# Patient Record
Sex: MALE | Race: WHITE | NOT HISPANIC OR LATINO | Employment: OTHER | URBAN - METROPOLITAN AREA
[De-identification: names, ages, dates, MRNs, and addresses within clinical notes are randomized per-mention and may not be internally consistent; named-entity substitution may affect disease eponyms.]

---

## 2017-03-23 ENCOUNTER — APPOINTMENT (OUTPATIENT)
Dept: GENERAL RADIOLOGY | Facility: HOSPITAL | Age: 82
End: 2017-03-23

## 2017-03-23 ENCOUNTER — HOSPITAL ENCOUNTER (OUTPATIENT)
Facility: HOSPITAL | Age: 82
Setting detail: OBSERVATION
Discharge: HOME HEALTH | End: 2017-03-25
Attending: EMERGENCY MEDICINE | Admitting: INTERNAL MEDICINE

## 2017-03-23 ENCOUNTER — APPOINTMENT (OUTPATIENT)
Dept: CT IMAGING | Facility: HOSPITAL | Age: 82
End: 2017-03-23

## 2017-03-23 DIAGNOSIS — R11.10 VOMITING AND DIARRHEA: Primary | ICD-10-CM

## 2017-03-23 DIAGNOSIS — R19.7 VOMITING AND DIARRHEA: Primary | ICD-10-CM

## 2017-03-23 DIAGNOSIS — J96.01 ACUTE RESPIRATORY FAILURE WITH HYPOXIA (HCC): ICD-10-CM

## 2017-03-23 DIAGNOSIS — R07.89 ATYPICAL CHEST PAIN: ICD-10-CM

## 2017-03-23 PROBLEM — E86.0 DEHYDRATION: Status: ACTIVE | Noted: 2017-03-23

## 2017-03-23 PROBLEM — F41.9 ANXIETY: Status: ACTIVE | Noted: 2017-03-23

## 2017-03-23 PROBLEM — I10 HTN (HYPERTENSION): Status: ACTIVE | Noted: 2017-03-23

## 2017-03-23 LAB
ALBUMIN SERPL-MCNC: 4 G/DL (ref 3.5–5.2)
ALBUMIN/GLOB SERPL: 1.1 G/DL
ALP SERPL-CCNC: 96 U/L (ref 39–117)
ALT SERPL W P-5'-P-CCNC: 20 U/L (ref 1–41)
ANION GAP SERPL CALCULATED.3IONS-SCNC: 16.2 MMOL/L
AST SERPL-CCNC: 18 U/L (ref 1–40)
BACTERIA UR QL AUTO: ABNORMAL /HPF
BASOPHILS # BLD AUTO: 0 10*3/MM3 (ref 0–0.2)
BASOPHILS NFR BLD AUTO: 0 % (ref 0–1.5)
BILIRUB SERPL-MCNC: 0.5 MG/DL (ref 0.1–1.2)
BILIRUB UR QL STRIP: NEGATIVE
BUN BLD-MCNC: 19 MG/DL (ref 8–23)
BUN/CREAT SERPL: 30.2 (ref 7–25)
CALCIUM SPEC-SCNC: 9.1 MG/DL (ref 8.6–10.5)
CHLORIDE SERPL-SCNC: 99 MMOL/L (ref 98–107)
CLARITY UR: CLEAR
CO2 SERPL-SCNC: 25.8 MMOL/L (ref 22–29)
COLOR UR: YELLOW
CREAT BLD-MCNC: 0.63 MG/DL (ref 0.76–1.27)
DEPRECATED RDW RBC AUTO: 47.6 FL (ref 37–54)
EOSINOPHIL # BLD AUTO: 0 10*3/MM3 (ref 0–0.7)
EOSINOPHIL NFR BLD AUTO: 0 % (ref 0.3–6.2)
ERYTHROCYTE [DISTWIDTH] IN BLOOD BY AUTOMATED COUNT: 14.5 % (ref 11.5–14.5)
GFR SERPL CREATININE-BSD FRML MDRD: 121 ML/MIN/1.73
GLOBULIN UR ELPH-MCNC: 3.5 GM/DL
GLUCOSE BLD-MCNC: 121 MG/DL (ref 65–99)
GLUCOSE UR STRIP-MCNC: NEGATIVE MG/DL
HCT VFR BLD AUTO: 42.8 % (ref 40.4–52.2)
HGB BLD-MCNC: 14.6 G/DL (ref 13.7–17.6)
HGB UR QL STRIP.AUTO: ABNORMAL
HOLD SPECIMEN: NORMAL
HOLD SPECIMEN: NORMAL
HYALINE CASTS UR QL AUTO: ABNORMAL /LPF
IMM GRANULOCYTES # BLD: 0.03 10*3/MM3 (ref 0–0.03)
IMM GRANULOCYTES NFR BLD: 0.3 % (ref 0–0.5)
KETONES UR QL STRIP: NEGATIVE
LEUKOCYTE ESTERASE UR QL STRIP.AUTO: NEGATIVE
LIPASE SERPL-CCNC: 38 U/L (ref 13–60)
LYMPHOCYTES # BLD AUTO: 0.19 10*3/MM3 (ref 0.9–4.8)
LYMPHOCYTES NFR BLD AUTO: 2 % (ref 19.6–45.3)
MCH RBC QN AUTO: 30.9 PG (ref 27–32.7)
MCHC RBC AUTO-ENTMCNC: 34.1 G/DL (ref 32.6–36.4)
MCV RBC AUTO: 90.7 FL (ref 79.8–96.2)
MONOCYTES # BLD AUTO: 0.65 10*3/MM3 (ref 0.2–1.2)
MONOCYTES NFR BLD AUTO: 6.7 % (ref 5–12)
NEUTROPHILS # BLD AUTO: 8.84 10*3/MM3 (ref 1.9–8.1)
NEUTROPHILS NFR BLD AUTO: 91 % (ref 42.7–76)
NITRITE UR QL STRIP: NEGATIVE
NT-PROBNP SERPL-MCNC: 304.2 PG/ML (ref 0–1800)
PH UR STRIP.AUTO: 7.5 [PH] (ref 5–8)
PLATELET # BLD AUTO: 120 10*3/MM3 (ref 140–500)
PMV BLD AUTO: 10.6 FL (ref 6–12)
POTASSIUM BLD-SCNC: 3.7 MMOL/L (ref 3.5–5.2)
PROT SERPL-MCNC: 7.5 G/DL (ref 6–8.5)
PROT UR QL STRIP: ABNORMAL
RBC # BLD AUTO: 4.72 10*6/MM3 (ref 4.6–6)
RBC # UR: ABNORMAL /HPF
REF LAB TEST METHOD: ABNORMAL
SODIUM BLD-SCNC: 141 MMOL/L (ref 136–145)
SP GR UR STRIP: >=1.03 (ref 1–1.03)
SQUAMOUS #/AREA URNS HPF: ABNORMAL /HPF
TROPONIN T SERPL-MCNC: <0.01 NG/ML (ref 0–0.03)
UROBILINOGEN UR QL STRIP: ABNORMAL
WBC NRBC COR # BLD: 9.71 10*3/MM3 (ref 4.5–10.7)
WBC UR QL AUTO: ABNORMAL /HPF
WHOLE BLOOD HOLD SPECIMEN: NORMAL
WHOLE BLOOD HOLD SPECIMEN: NORMAL

## 2017-03-23 PROCEDURE — 80053 COMPREHEN METABOLIC PANEL: CPT | Performed by: EMERGENCY MEDICINE

## 2017-03-23 PROCEDURE — 0 IOPAMIDOL PER 1 ML: Performed by: EMERGENCY MEDICINE

## 2017-03-23 PROCEDURE — 93010 ELECTROCARDIOGRAM REPORT: CPT | Performed by: INTERNAL MEDICINE

## 2017-03-23 PROCEDURE — 85025 COMPLETE CBC W/AUTO DIFF WBC: CPT | Performed by: EMERGENCY MEDICINE

## 2017-03-23 PROCEDURE — 96372 THER/PROPH/DIAG INJ SC/IM: CPT

## 2017-03-23 PROCEDURE — G0378 HOSPITAL OBSERVATION PER HR: HCPCS

## 2017-03-23 PROCEDURE — 25010000002 ENOXAPARIN PER 10 MG: Performed by: INTERNAL MEDICINE

## 2017-03-23 PROCEDURE — 81001 URINALYSIS AUTO W/SCOPE: CPT | Performed by: EMERGENCY MEDICINE

## 2017-03-23 PROCEDURE — 93005 ELECTROCARDIOGRAM TRACING: CPT | Performed by: EMERGENCY MEDICINE

## 2017-03-23 PROCEDURE — 84484 ASSAY OF TROPONIN QUANT: CPT | Performed by: EMERGENCY MEDICINE

## 2017-03-23 PROCEDURE — 96375 TX/PRO/DX INJ NEW DRUG ADDON: CPT

## 2017-03-23 PROCEDURE — 83690 ASSAY OF LIPASE: CPT | Performed by: EMERGENCY MEDICINE

## 2017-03-23 PROCEDURE — 96374 THER/PROPH/DIAG INJ IV PUSH: CPT

## 2017-03-23 PROCEDURE — 25810000003 SODIUM CHLORIDE 0.9 % WITH KCL 20 MEQ 20-0.9 MEQ/L-% SOLUTION: Performed by: INTERNAL MEDICINE

## 2017-03-23 PROCEDURE — 83880 ASSAY OF NATRIURETIC PEPTIDE: CPT | Performed by: EMERGENCY MEDICINE

## 2017-03-23 PROCEDURE — 74177 CT ABD & PELVIS W/CONTRAST: CPT

## 2017-03-23 PROCEDURE — 25010000002 ONDANSETRON PER 1 MG: Performed by: EMERGENCY MEDICINE

## 2017-03-23 PROCEDURE — 99285 EMERGENCY DEPT VISIT HI MDM: CPT

## 2017-03-23 PROCEDURE — 71020 HC CHEST PA AND LATERAL: CPT

## 2017-03-23 PROCEDURE — 71275 CT ANGIOGRAPHY CHEST: CPT

## 2017-03-23 PROCEDURE — 96361 HYDRATE IV INFUSION ADD-ON: CPT

## 2017-03-23 PROCEDURE — 25010000002 MORPHINE PER 10 MG: Performed by: EMERGENCY MEDICINE

## 2017-03-23 RX ORDER — SERTRALINE HYDROCHLORIDE 100 MG/1
200 TABLET, FILM COATED ORAL DAILY
COMMUNITY

## 2017-03-23 RX ORDER — LISINOPRIL 5 MG/1
5 TABLET ORAL DAILY
Status: DISCONTINUED | OUTPATIENT
Start: 2017-03-23 | End: 2017-03-25 | Stop reason: HOSPADM

## 2017-03-23 RX ORDER — AMLODIPINE BESYLATE 2.5 MG/1
2.5 TABLET ORAL DAILY
Status: DISCONTINUED | OUTPATIENT
Start: 2017-03-23 | End: 2017-03-25 | Stop reason: HOSPADM

## 2017-03-23 RX ORDER — HYDROCODONE BITARTRATE AND ACETAMINOPHEN 7.5; 325 MG/1; MG/1
1 TABLET ORAL ONCE
Status: COMPLETED | OUTPATIENT
Start: 2017-03-23 | End: 2017-03-23

## 2017-03-23 RX ORDER — SERTRALINE HYDROCHLORIDE 100 MG/1
200 TABLET, FILM COATED ORAL DAILY
Status: DISCONTINUED | OUTPATIENT
Start: 2017-03-23 | End: 2017-03-25 | Stop reason: HOSPADM

## 2017-03-23 RX ORDER — ACETAMINOPHEN 325 MG/1
325 TABLET ORAL 4 TIMES DAILY
Status: DISCONTINUED | OUTPATIENT
Start: 2017-03-23 | End: 2017-03-25 | Stop reason: HOSPADM

## 2017-03-23 RX ORDER — SENNA AND DOCUSATE SODIUM 50; 8.6 MG/1; MG/1
2 TABLET, FILM COATED ORAL NIGHTLY PRN
COMMUNITY

## 2017-03-23 RX ORDER — ASPIRIN 325 MG
325 TABLET ORAL ONCE
Status: DISCONTINUED | OUTPATIENT
Start: 2017-03-23 | End: 2017-03-23

## 2017-03-23 RX ORDER — SODIUM CHLORIDE 0.9 % (FLUSH) 0.9 %
1-10 SYRINGE (ML) INJECTION AS NEEDED
Status: DISCONTINUED | OUTPATIENT
Start: 2017-03-23 | End: 2017-03-25 | Stop reason: HOSPADM

## 2017-03-23 RX ORDER — OMEGA-3S/DHA/EPA/FISH OIL/D3 300MG-1000
400 CAPSULE ORAL DAILY
Status: DISCONTINUED | OUTPATIENT
Start: 2017-03-23 | End: 2017-03-25 | Stop reason: HOSPADM

## 2017-03-23 RX ORDER — ASPIRIN 81 MG/1
81 TABLET ORAL DAILY
Status: DISCONTINUED | OUTPATIENT
Start: 2017-03-23 | End: 2017-03-25 | Stop reason: HOSPADM

## 2017-03-23 RX ORDER — PANTOPRAZOLE SODIUM 40 MG/1
40 TABLET, DELAYED RELEASE ORAL
Status: DISCONTINUED | OUTPATIENT
Start: 2017-03-24 | End: 2017-03-25 | Stop reason: HOSPADM

## 2017-03-23 RX ORDER — MORPHINE SULFATE 2 MG/ML
2 INJECTION, SOLUTION INTRAMUSCULAR; INTRAVENOUS EVERY 4 HOURS PRN
Status: DISCONTINUED | OUTPATIENT
Start: 2017-03-23 | End: 2017-03-25 | Stop reason: HOSPADM

## 2017-03-23 RX ORDER — MORPHINE SULFATE 2 MG/ML
2 INJECTION, SOLUTION INTRAMUSCULAR; INTRAVENOUS ONCE
Status: COMPLETED | OUTPATIENT
Start: 2017-03-23 | End: 2017-03-23

## 2017-03-23 RX ORDER — MORPHINE SULFATE 2 MG/ML
INJECTION, SOLUTION INTRAMUSCULAR; INTRAVENOUS
Status: DISPENSED
Start: 2017-03-23 | End: 2017-03-24

## 2017-03-23 RX ORDER — SODIUM CHLORIDE AND POTASSIUM CHLORIDE 150; 900 MG/100ML; MG/100ML
100 INJECTION, SOLUTION INTRAVENOUS CONTINUOUS
Status: DISCONTINUED | OUTPATIENT
Start: 2017-03-23 | End: 2017-03-25

## 2017-03-23 RX ORDER — HYDROCODONE BITARTRATE AND ACETAMINOPHEN 7.5; 325 MG/1; MG/1
1 TABLET ORAL 4 TIMES DAILY
Status: DISCONTINUED | OUTPATIENT
Start: 2017-03-23 | End: 2017-03-25 | Stop reason: HOSPADM

## 2017-03-23 RX ORDER — ASPIRIN 81 MG/1
81 TABLET ORAL DAILY
COMMUNITY

## 2017-03-23 RX ORDER — HYDROCODONE BITARTRATE AND ACETAMINOPHEN 7.5; 325 MG/1; MG/1
1 TABLET ORAL EVERY 6 HOURS PRN
Status: DISCONTINUED | OUTPATIENT
Start: 2017-03-23 | End: 2017-03-25 | Stop reason: HOSPADM

## 2017-03-23 RX ORDER — OMEPRAZOLE 20 MG/1
20 CAPSULE, DELAYED RELEASE ORAL EVERY MORNING
COMMUNITY

## 2017-03-23 RX ORDER — LORAZEPAM 0.5 MG/1
0.5 TABLET ORAL NIGHTLY
Status: DISCONTINUED | OUTPATIENT
Start: 2017-03-23 | End: 2017-03-25 | Stop reason: HOSPADM

## 2017-03-23 RX ORDER — AMLODIPINE BESYLATE 2.5 MG/1
2.5 TABLET ORAL DAILY
COMMUNITY

## 2017-03-23 RX ORDER — HYDROCODONE BITARTRATE AND ACETAMINOPHEN 7.5; 325 MG/1; MG/1
1 TABLET ORAL 4 TIMES DAILY
COMMUNITY

## 2017-03-23 RX ORDER — ERGOCALCIFEROL (VITAMIN D2) 10 MCG
400 TABLET ORAL DAILY
COMMUNITY

## 2017-03-23 RX ORDER — ONDANSETRON 2 MG/ML
4 INJECTION INTRAMUSCULAR; INTRAVENOUS EVERY 6 HOURS PRN
Status: DISCONTINUED | OUTPATIENT
Start: 2017-03-23 | End: 2017-03-25 | Stop reason: HOSPADM

## 2017-03-23 RX ORDER — LISINOPRIL 5 MG/1
5 TABLET ORAL DAILY
COMMUNITY

## 2017-03-23 RX ORDER — ONDANSETRON 2 MG/ML
4 INJECTION INTRAMUSCULAR; INTRAVENOUS ONCE
Status: COMPLETED | OUTPATIENT
Start: 2017-03-23 | End: 2017-03-23

## 2017-03-23 RX ORDER — SENNA AND DOCUSATE SODIUM 50; 8.6 MG/1; MG/1
2 TABLET, FILM COATED ORAL NIGHTLY PRN
Status: DISCONTINUED | OUTPATIENT
Start: 2017-03-23 | End: 2017-03-25 | Stop reason: HOSPADM

## 2017-03-23 RX ORDER — ALFUZOSIN HYDROCHLORIDE 10 MG/1
10 TABLET, EXTENDED RELEASE ORAL
COMMUNITY

## 2017-03-23 RX ORDER — SODIUM CHLORIDE 0.9 % (FLUSH) 0.9 %
10 SYRINGE (ML) INJECTION AS NEEDED
Status: DISCONTINUED | OUTPATIENT
Start: 2017-03-23 | End: 2017-03-25 | Stop reason: HOSPADM

## 2017-03-23 RX ORDER — ACETAMINOPHEN 325 MG/1
325 TABLET ORAL 4 TIMES DAILY
COMMUNITY

## 2017-03-23 RX ADMIN — ASPIRIN 81 MG: 81 TABLET ORAL at 22:51

## 2017-03-23 RX ADMIN — HYDROCODONE BITARTRATE AND ACETAMINOPHEN 1 TABLET: 7.5; 325 TABLET ORAL at 16:12

## 2017-03-23 RX ADMIN — AMLODIPINE BESYLATE 2.5 MG: 2.5 TABLET ORAL at 22:51

## 2017-03-23 RX ADMIN — IOPAMIDOL 95 ML: 755 INJECTION, SOLUTION INTRAVENOUS at 15:03

## 2017-03-23 RX ADMIN — LORAZEPAM 0.5 MG: 0.5 TABLET ORAL at 22:52

## 2017-03-23 RX ADMIN — HYDROCODONE BITARTRATE AND ACETAMINOPHEN 1 TABLET: 7.5; 325 TABLET ORAL at 22:50

## 2017-03-23 RX ADMIN — LISINOPRIL 5 MG: 5 TABLET ORAL at 22:51

## 2017-03-23 RX ADMIN — SODIUM CHLORIDE 500 ML: 9 INJECTION, SOLUTION INTRAVENOUS at 13:15

## 2017-03-23 RX ADMIN — ENOXAPARIN SODIUM 30 MG: 30 INJECTION SUBCUTANEOUS at 22:51

## 2017-03-23 RX ADMIN — POTASSIUM CHLORIDE AND SODIUM CHLORIDE 100 ML/HR: 900; 150 INJECTION, SOLUTION INTRAVENOUS at 22:52

## 2017-03-23 RX ADMIN — SERTRALINE 200 MG: 100 TABLET, FILM COATED ORAL at 22:50

## 2017-03-23 RX ADMIN — ONDANSETRON 4 MG: 2 INJECTION INTRAMUSCULAR; INTRAVENOUS at 16:12

## 2017-03-23 RX ADMIN — MORPHINE SULFATE 2 MG: 2 INJECTION, SOLUTION INTRAMUSCULAR; INTRAVENOUS at 13:15

## 2017-03-23 NOTE — H&P
Internal medicine history and physical   INTERNAL MEDICINE   [facility]       Patient Identification:  Name: Martin Krueger  Age: 85 y.o.  Sex: male  :  10/27/1931  MRN: [id number]                   Primary Care Physician: No Known Provider                                   Chief Complaint:  Feeling sick with chest pain and nausea and vomiting going on for a few days at least 3 days after he ate chicken.    History of Present Illness:   Patient is very vague in his description of symptoms and it was an effort to get proper information from him.  I'm still not sure whether I was able to get reliable information from him.  Patient is a 85-year-old male with conjugated past medical history has been complaining of nausea and stomach and back pain since he ate some chicken 3-4 days ago.  Patient is not sure about exact timing of his onset of symptoms.  He's been struggling with these symptoms taking hydrocodone pills without any improvement.  In the emergency room and has been having multiple episodes of diarrhea after episode of vomiting.  Patient has been having chest discomfort mainly pressure-like and has associated shortness of breath  Patient usually gets his care at the [facility].  He is pleasantly confused.      Past Medical History:  Past Medical History:   Diagnosis Date   • Arthritis    • Brain injury    • Cancer    • Hypertension    • Injury of back      Past Surgical History:  Past Surgical History:   Procedure Laterality Date   • BRAIN SURGERY     • SKIN BIOPSY        Home Meds:    (Not in a hospital admission)  Current Meds:     Current Facility-Administered Medications:   •  sodium chloride 0.9 % flush 10 mL, 10 mL, Intravenous, PRN, Prabhjot Carvalho MD    Current Outpatient Prescriptions:   •  acetaminophen (TYLENOL) 325 MG tablet, Take 325 mg by mouth 4 (Four) Times a Day. Take with hydrocodone for pain , Disp: , Rfl:   •  alfuzosin (UROXATRAL) 10 MG 24 hr tablet, Take 10 mg by  "mouth every night at bedtime., Disp: , Rfl:   •  amLODIPine (NORVASC) 2.5 MG tablet, Take 2.5 mg by mouth Daily., Disp: , Rfl:   •  aspirin 81 MG EC tablet, Take 81 mg by mouth Daily., Disp: , Rfl:   •  HYDROcodone-acetaminophen (NORCO) 7.5-325 MG per tablet, Take 1 tablet by mouth 4 (Four) Times a Day., Disp: , Rfl:   •  lisinopril (PRINIVIL,ZESTRIL) 5 MG tablet, Take 5 mg by mouth Daily., Disp: , Rfl:   •  MENTHOL-METHYL SALICYLATE EX, Apply 1 application topically 3 (Three) Times a Day., Disp: , Rfl:   •  miconazole (MICOTIN) 2 % powder, Apply 1 application topically 2 (Two) Times a Day As Needed for Itching (for feet and groin area)., Disp: , Rfl:   •  omeprazole (priLOSEC) 20 MG capsule, Take 20 mg by mouth Every Morning., Disp: , Rfl:   •  sennosides-docusate sodium (SENOKOT-S) 8.6-50 MG tablet, Take 2 tablets by mouth At Night As Needed for Constipation., Disp: , Rfl:   •  sertraline (ZOLOFT) 100 MG tablet, Take 200 mg by mouth Daily., Disp: , Rfl:   •  Tiotropium Bromide Monohydrate 2.5 MCG/ACT aerosol solution, Inhale 2 puffs Daily., Disp: , Rfl:   •  Vitamin D, Cholecalciferol, (CHOLECALCIFEROL) 400 UNITS tablet, Take 400 Units by mouth Daily., Disp: , Rfl:   Allergies:  No Known Allergies  Social History:   Social History   Substance Use Topics   • Smoking status: Unknown If Ever Smoked   • Smokeless tobacco: Not on file   • Alcohol use Defer      Family History:  History reviewed. No pertinent family history.       Review of Systems  See history of present illness and past medical history.  Constitutional remarkable for no fever or chills  Cardiovascular remarkable for chest discomfort as described  GI review systems remarkable for nausea vomiting diarrhea and abdominal pain  Vitals:   /69  Pulse 95  Temp 98 °F (36.7 °C)  Resp 16  Ht 67\" (170.2 cm)  Wt 155 lb (70.3 kg)  SpO2 93%  BMI 24.28 kg/m2  I/O: No intake or output data in the 24 hours ending 03/23/17 1276  Exam:  General Appearance:   "  Alert, resistant to examination, no distress, appears stated age   Head:    Normocephalic, without obvious abnormality, atraumatic   Eyes:    PERRL, conjunctiva/corneas clear, EOM's intact, both eyes   Ears:    Normal external ear canals, both ears   Nose:   Nares normal, septum midline, mucosa normal, no drainage    or sinus tenderness   Throat:   Lips, tongue, gums normal; oral mucosa pink and moist   Neck:   Supple, symmetrical, trachea midline, no adenopathy;     thyroid:  no enlargement/tenderness/nodules; no carotid    bruit or JVD   Back:     Symmetric, no curvature, ROM normal, no CVA tenderness   Lungs:     Clear to auscultation bilaterally, respirations unlabored   Chest Wall:    No tenderness or deformity    Heart:    Regular rate and rhythm, S1 and S2 normal, no murmur, rub   or gallop   Abdomen:     Soft, slightly distended and bloated with generalized tenderness , bowel sounds active all four quadrants,     no masses, no hepatomegaly, no splenomegaly   Extremities:   Extremities normal, atraumatic, no cyanosis or edema   Pulses:   Pulses palpable in all extremities; symmetric all extremities   Skin:   Skin color normal, Skin is warm and dry,  no rashes or palpable lesions   Neurologic:   CNII-XII intact, motor strength grossly intact, sensation grossly intact to light touch, no focal deficits noted       Data Review:      I reviewed the patient's new clinical results.    Results from last 7 days  Lab Units 03/23/17  1316   WBC 10*3/mm3 9.71   HEMOGLOBIN g/dL 14.6   PLATELETS 10*3/mm3 120*       Results from last 7 days  Lab Units 03/23/17  1316   SODIUM mmol/L 141   POTASSIUM mmol/L 3.7   CHLORIDE mmol/L 99   TOTAL CO2 mmol/L 25.8   BUN mg/dL 19   CREATININE mg/dL 0.63*   CALCIUM mg/dL 9.1   GLUCOSE mg/dL 121*     Impression:         1. CTA chest demonstrates no evidence for pulmonary embolism. No acute  process is identified at the chest. There is advanced background  emphysematous change.  2. No  acute process is identified at the abdomen or pelvis.       Assessment:  Principal Problem:    Vomiting and diarrhea  Active Problems:    HTN (hypertension)    Anxiety    Dehydration      Plan:  Admit the patient keep him nothing by mouth except for ice chips and medications, with respiratory viral panel continues on medications, cautious hydration and further management as his condition evolves.    Rosas Tovar MD   3/23/2017  6:36 PM  Much of this encounter note is an electronic transcription/translation of spoken language to printed text. The electronic translation of spoken language may permit erroneous, or at times, nonsensical words or phrases to be inadvertently transcribed; Although I have reviewed the note for such errors, some may still exist

## 2017-03-23 NOTE — ED NOTES
"Pt reports nausea and stomach and back pain.  Pt states he usually takes \"4 hydrocodone pain pills\" every day and states his last time taking one was yesterday.  Pt is shivering. MD notified.     Sanjana Woods RN  03/23/17 1654       Sanjana Woods RN  03/23/17 5822    "

## 2017-03-23 NOTE — ED PROVIDER NOTES
EMERGENCY DEPARTMENT ENCOUNTER    CHIEF COMPLAINT  Chief Complaint: N/V/D  History given by: patient   History limited by: nothing   Room Number: 29/29  PMD: No Known Provider    HPI:  Pt is a 85 y.o. male who presents complaining of N/V/D onset around 2100 last night. Pt also complains of abd pain and chest pressure. Pt reports he has had 1 episode of vomiting and multiple episodes of diarrhea that is not bloody. Pt received Zofran en route. Pt has a h/o cancer, hypertension, and brain injury. Pt has had AAA stent, cardiac stents, brain stents, cardioversion, and a cholecystectomy. [facility]     Duration:  hours  Onset: last night at 2100  Timing: constant   Location: abd, chest  Radiation: does not radiate   Quality: new   Intensity/Severity: moderate   Progression: unchanged   Associated Symptoms: N/V/D, abd pain, chset pressur   Aggravating Factors: none specified   Alleviating Factors: none specified   Previous Episodes: none specified   Treatment before arrival: Zofran en route     PAST MEDICAL HISTORY  Active Ambulatory Problems     Diagnosis Date Noted   • No Active Ambulatory Problems     Resolved Ambulatory Problems     Diagnosis Date Noted   • No Resolved Ambulatory Problems     Past Medical History:   Diagnosis Date   • Arthritis    • Brain injury    • Cancer    • Hypertension    • Injury of back        PAST SURGICAL HISTORY  Past Surgical History:   Procedure Laterality Date   • BRAIN SURGERY     • SKIN BIOPSY         FAMILY HISTORY  History reviewed. No pertinent family history.    SOCIAL HISTORY  Social History     Social History   • Marital status:      Spouse name: N/A   • Number of children: N/A   • Years of education: N/A     Occupational History   • Not on file.     Social History Main Topics   • Smoking status: Unknown If Ever Smoked   • Smokeless tobacco: Not on file   • Alcohol use Defer   • Drug use: Not on file   • Sexual activity: Not on file      Other Topics Concern   • Not on file     Social History Narrative   • No narrative on file       ALLERGIES  Review of patient's allergies indicates no known allergies.    REVIEW OF SYSTEMS  Review of Systems   Constitutional: Negative for activity change, appetite change and fever.   HENT: Negative for congestion and sore throat.    Eyes: Negative.    Respiratory: Negative for cough and shortness of breath.    Cardiovascular: Positive for chest pain (pressure). Negative for leg swelling.   Gastrointestinal: Positive for abdominal pain, diarrhea, nausea and vomiting.   Endocrine: Negative.    Genitourinary: Negative for decreased urine volume and dysuria.   Musculoskeletal: Negative for neck pain.   Skin: Negative for rash and wound.   Allergic/Immunologic: Negative.    Neurological: Negative for weakness, numbness and headaches.   Hematological: Negative.    Psychiatric/Behavioral: Negative.    All other systems reviewed and are negative.      PHYSICAL EXAM  ED Triage Vitals   Temp Heart Rate Resp BP SpO2   03/23/17 1241 03/23/17 1241 03/23/17 1241 03/23/17 1241 03/23/17 1241   97.7 °F (36.5 °C) 83 18 158/88 96 %      Temp src Heart Rate Source Patient Position BP Location FiO2 (%)   03/23/17 1241 -- -- -- --   Oral           Physical Exam   Constitutional: He is oriented to person, place, and time and well-developed, well-nourished, and in no distress.   HENT:   Head: Normocephalic and atraumatic.   Eyes: EOM are normal. Pupils are equal, round, and reactive to light.   Neck: Normal range of motion. Neck supple.   Cardiovascular: Normal rate, regular rhythm and normal heart sounds.    Pulmonary/Chest: Effort normal and breath sounds normal. No respiratory distress.   Abdominal: Soft. There is generalized tenderness. There is no rebound and no guarding.   Musculoskeletal: Normal range of motion. He exhibits no edema.   Neurological: He is alert and oriented to person, place, and time. He has normal sensation and  normal strength.   Skin: Skin is warm and dry.   Psychiatric: Mood and affect normal.   Nursing note and vitals reviewed.      LAB RESULTS  Lab Results (last 24 hours)     Procedure Component Value Units Date/Time    CBC & Differential [[id number]] Collected:  03/23/17 1316    Specimen:  Blood Updated:  03/23/17 1327    Narrative:       The following orders were created for panel order CBC & Differential.  Procedure                               Abnormality         Status                     ---------                               -----------         ------                     CBC Auto Differential[[id number]]         Abnormal            Final result                 Please view results for these tests on the individual orders.    Comprehensive Metabolic Panel [[id number]]  (Abnormal) Collected:  03/23/17 1316    Specimen:  Blood Updated:  03/23/17 1348     Glucose 121 (H) mg/dL      BUN 19 mg/dL      Creatinine 0.63 (L) mg/dL      Sodium 141 mmol/L      Potassium 3.7 mmol/L      Chloride 99 mmol/L      CO2 25.8 mmol/L      Calcium 9.1 mg/dL      Total Protein 7.5 g/dL      Albumin 4.00 g/dL      ALT (SGPT) 20 U/L      AST (SGOT) 18 U/L      Alkaline Phosphatase 96 U/L      Total Bilirubin 0.5 mg/dL      eGFR Non African Amer 121 mL/min/1.73      Globulin 3.5 gm/dL      A/G Ratio 1.1 g/dL      BUN/Creatinine Ratio 30.2 (H)     Anion Gap 16.2 mmol/L     Narrative:       The MDRD GFR formula is only valid for adults with stable renal function between ages 18 and 70.    Troponin [[id number]]  (Normal) Collected:  03/23/17 1316    Specimen:  Blood Updated:  03/23/17 1353     Troponin T <0.010 ng/mL     Narrative:       Troponin T Reference Ranges:  Less than 0.03 ng/mL:    Negative for AMI  0.03 to 0.09 ng/mL:      Indeterminant for AMI  Greater than 0.09 ng/mL: Positive for AMI    CBC Auto Differential [[id number]]  (Abnormal) Collected:  03/23/17 1316    Specimen:  Blood Updated:  03/23/17 1327     WBC 9.71 10*3/mm3      RBC  4.72 10*6/mm3      Hemoglobin 14.6 g/dL      Hematocrit 42.8 %      MCV 90.7 fL      MCH 30.9 pg      MCHC 34.1 g/dL      RDW 14.5 %      RDW-SD 47.6 fl      MPV 10.6 fL      Platelets 120 (L) 10*3/mm3      Neutrophil % 91.0 (H) %      Lymphocyte % 2.0 (L) %      Monocyte % 6.7 %      Eosinophil % 0.0 (L) %      Basophil % 0.0 %      Immature Grans % 0.3 %      Neutrophils, Absolute 8.84 (H) 10*3/mm3      Lymphocytes, Absolute 0.19 (L) 10*3/mm3      Monocytes, Absolute 0.65 10*3/mm3      Eosinophils, Absolute 0.00 10*3/mm3      Basophils, Absolute 0.00 10*3/mm3      Immature Grans, Absolute 0.03 10*3/mm3     Lipase [[id number]]  (Normal) Collected:  03/23/17 1316    Specimen:  Blood Updated:  03/23/17 1348     Lipase 38 U/L     BNP [[id number]]  (Normal) Collected:  03/23/17 1316    Specimen:  Blood Updated:  03/23/17 1354     proBNP 304.2 pg/mL     Narrative:       Among patients with dyspnea, NT-proBNP is highly sensitive for the detection of acute congestive heart failure. In addition NT-proBNP of <300 pg/ml effectively rules out acute congestive heart failure with 99% negative predictive value.          I ordered the above labs and reviewed the results    RADIOLOGY  XR Chest 2 View   Final Result      CT Angiogram Chest With Contrast    (Results Pending)   CT Abdomen Pelvis With Contrast    (Results Pending)      CXR shows NAD.     CTA Chest is NAD.     CT abd/pelvis is negative.     I ordered the above noted radiological studies. Interpreted by radiologist. Discussed with radiologist (Dr. Abernathy). Reviewed by me in PACS.       PROCEDURES  Procedures  EKG           EKG time: 1305  Rhythm/Rate: NSR at 87  P waves and UT: normal  QRS, axis: RBBB   ST and T waves: T waves flipped in V2 and V3     Interpreted Contemporaneously by me, independently viewed  RBBB and T waves are new compared to prior 02/22/02    PROGRESS AND CONSULTS  ED Course   Comment By Time   3:37 PM  Patient here for dry heaves and diarrhea.  Has  chest discomfort that I think is GI related.  Chest and abdominal Ct negative.  Still appears ill.  Discussed with Dr. Tovar who will admit.  Patient also has low O2 sats with no history of COPD but emphysematous changes on CT. Prabhjot Carvalho MD 03/23 1537     1245: Ordered CXR, EKG, and labs for further evaluation.     1305: Ordered labs, morphine, and fluids.     1402: Ordered CT abd/pelvis and CTA chest for further evaluation.     1521: Rechecked pt. Pt states he still does not feel well. Discussed negative workup and plan to admit for further evaluation and treatment. Pt understands and agrees with plan and all questions were addressed.    1532: Discussed pt's case with Dr. Tovar (internal medicine), who agrees to admit pt.      1608: Ordered Norco and Zofran because he has not had usual dose of hydrocodone today.     MEDICAL DECISION MAKING  Results were reviewed/discussed with the patient and they were also made aware of online access. Pt also made aware that some labs, such as cultures, will not be resulted during ER visit and follow up with PMD is necessary.     MDM  Number of Diagnoses or Management Options     Amount and/or Complexity of Data Reviewed  Clinical lab tests: ordered and reviewed  Tests in the radiology section of CPT®: ordered and reviewed  Tests in the medicine section of CPT®: reviewed and ordered (EKG time: 1305  Rhythm/Rate: NSR at 87  P waves and ME: normal  QRS, axis: RBBB   ST and T waves: T waves flipped in V2 and V3     Interpreted Contemporaneously by me, independently viewed  RBBB and T waves are new compared to prior 02/22/02  )  Discuss the patient with other providers: yes    Patient Progress  Patient progress: stable       DIAGNOSIS  Final diagnoses:   Vomiting and diarrhea   Atypical chest pain       DISPOSITION  ADMISSION    Discussed treatment plan and reason for admission with pt/family and admitting physician.  Pt/family voiced understanding of the plan for admission  for further testing/treatment as needed.     Latest Documented Vital Signs:  As of 3:42 PM  BP- 137/70 HR- 86 Temp- 98 °F (36.7 °C) O2 sat- 95%    --  Documentation assistance provided by adan Schwab for Prabhjot Carvalho.  Information recorded by the scribe was done at my direction and has been verified and validated by me.           Ruby Schwab  03/23/17 1610       Prabhjot Carvalho MD  03/23/17 1723

## 2017-03-23 NOTE — ED NOTES
2mg Morphine given, unable to chart medication at time of administration due to pharmacy verifying medications locks the computer program.      Sanjana Woods RN  03/23/17 2745

## 2017-03-23 NOTE — PROGRESS NOTES
Clinical Pharmacy Services: Medication History    Martin Krueger is a 85 y.o. male presenting to [facility] Emergency Department with chief complaint of:   NV CP     He  has a past medical history of Arthritis; Brain injury; Cancer; Hypertension; and Injury of back.    Allergies as of 03/23/2017   • (No Known Allergies)       Medication information was obtained from: Patient's med list from [facility]     Pharmacy and Phone Number: primary pharmacy is [facility]   [facility] [address], updated in [employer]    Prior to Admission Medications       Prescriptions Last Dose Informant Patient Reported? Taking?      miconazole (MICOTIN) 2 % powder 3/22/2017 Medication Bottle Yes Yes    Apply 1 application topically 2 (Two) Times a Day As Needed for Itching (for feet and groin area).    omeprazole (priLOSEC) 20 MG capsule 3/22/2017 Medication Bottle Yes Yes    Take 20 mg by mouth Every Morning.    sertraline (ZOLOFT) 100 MG tablet 3/22/2017 Medication Bottle Yes Yes    Take 200 mg by mouth Daily.    Tiotropium Bromide Monohydrate 2.5 MCG/ACT aerosol solution 3/22/2017 Medication Bottle Yes Yes    Inhale 2 puffs Daily.    acetaminophen (TYLENOL) 325 MG tablet 3/22/2017 Medication Bottle Yes Yes    Take 325 mg by mouth 4 (Four) Times a Day. Take with hydrocodone for pain     alfuzosin (UROXATRAL) 10 MG 24 hr tablet 3/22/2017 Medication Bottle Yes Yes    Take 10 mg by mouth every night at bedtime.    amLODIPine (NORVASC) 2.5 MG tablet 3/22/2017 Medication Bottle Yes Yes    Take 2.5 mg by mouth Daily.    aspirin 81 MG EC tablet 3/22/2017 Medication Bottle Yes Yes    Take 81 mg by mouth Daily.    HYDROcodone-acetaminophen (NORCO) 7.5-325 MG per tablet 3/22/2017 Medication Bottle Yes Yes    Take 1 tablet by mouth 4 (Four) Times a Day.    lisinopril (PRINIVIL,ZESTRIL) 5 MG tablet 3/22/2017 Medication Bottle Yes Yes    Take 5 mg by mouth Daily.    MENTHOL-METHYL SALICYLATE EX 3/22/2017  Medication Bottle Yes Yes    Apply 1 application topically 3 (Three) Times a Day.    sennosides-docusate sodium (SENOKOT-S) 8.6-50 MG tablet 3/22/2017 Medication Bottle Yes Yes    Take 2 tablets by mouth At Night As Needed for Constipation.    Vitamin D, Cholecalciferol, (CHOLECALCIFEROL) 400 UNITS tablet 3/22/2017 Medication Bottle Yes Yes    Take 400 Units by mouth Daily.       Pt has not taken any medications today.      This medication list is complete to the best of my knowledge as of 3/23/2017    Please call if questions.    Nellie Wagner, PharmD, BCPS  3/23/2017 1:36 PM

## 2017-03-23 NOTE — ED NOTES
Dr. Guy mosley, called back, states he will put in admission orders in 15 minutes.      Sanjana Woods RN  03/23/17 1146

## 2017-03-24 PROBLEM — R74.8 ABNORMAL LIVER ENZYMES: Status: ACTIVE | Noted: 2017-03-24

## 2017-03-24 LAB
ALBUMIN SERPL-MCNC: 3.3 G/DL (ref 3.5–5.2)
ALBUMIN/GLOB SERPL: 1.2 G/DL
ALP SERPL-CCNC: 71 U/L (ref 39–117)
ALT SERPL W P-5'-P-CCNC: 50 U/L (ref 1–41)
ANION GAP SERPL CALCULATED.3IONS-SCNC: 16.4 MMOL/L
AST SERPL-CCNC: 49 U/L (ref 1–40)
B PERT DNA SPEC QL NAA+PROBE: NOT DETECTED
BASOPHILS # BLD AUTO: 0 10*3/MM3 (ref 0–0.2)
BASOPHILS NFR BLD AUTO: 0 % (ref 0–1.5)
BILIRUB SERPL-MCNC: 0.4 MG/DL (ref 0.1–1.2)
BUN BLD-MCNC: 31 MG/DL (ref 8–23)
BUN/CREAT SERPL: 42.5 (ref 7–25)
C PNEUM DNA NPH QL NAA+NON-PROBE: NOT DETECTED
CALCIUM SPEC-SCNC: 7.8 MG/DL (ref 8.6–10.5)
CHLORIDE SERPL-SCNC: 104 MMOL/L (ref 98–107)
CO2 SERPL-SCNC: 21.6 MMOL/L (ref 22–29)
CREAT BLD-MCNC: 0.73 MG/DL (ref 0.76–1.27)
DEPRECATED RDW RBC AUTO: 51.1 FL (ref 37–54)
EOSINOPHIL # BLD AUTO: 0.06 10*3/MM3 (ref 0–0.7)
EOSINOPHIL NFR BLD AUTO: 0.8 % (ref 0.3–6.2)
ERYTHROCYTE [DISTWIDTH] IN BLOOD BY AUTOMATED COUNT: 15.1 % (ref 11.5–14.5)
FLUAV H1 2009 PAND RNA NPH QL NAA+PROBE: NOT DETECTED
FLUAV H1 HA GENE NPH QL NAA+PROBE: NOT DETECTED
FLUAV H3 RNA NPH QL NAA+PROBE: NOT DETECTED
FLUAV SUBTYP SPEC NAA+PROBE: NOT DETECTED
FLUBV RNA ISLT QL NAA+PROBE: NOT DETECTED
GFR SERPL CREATININE-BSD FRML MDRD: 102 ML/MIN/1.73
GLOBULIN UR ELPH-MCNC: 2.8 GM/DL
GLUCOSE BLD-MCNC: 102 MG/DL (ref 65–99)
HADV DNA SPEC NAA+PROBE: NOT DETECTED
HAV IGM SERPL QL IA: NORMAL
HBV CORE IGM SERPL QL IA: NORMAL
HBV SURFACE AG SERPL QL IA: NORMAL
HCOV 229E RNA SPEC QL NAA+PROBE: NOT DETECTED
HCOV HKU1 RNA SPEC QL NAA+PROBE: NOT DETECTED
HCOV NL63 RNA SPEC QL NAA+PROBE: NOT DETECTED
HCOV OC43 RNA SPEC QL NAA+PROBE: NOT DETECTED
HCT VFR BLD AUTO: 35.4 % (ref 40.4–52.2)
HCV AB SER DONR QL: NORMAL
HGB BLD-MCNC: 11.7 G/DL (ref 13.7–17.6)
HMPV RNA NPH QL NAA+NON-PROBE: NOT DETECTED
HPIV1 RNA SPEC QL NAA+PROBE: NOT DETECTED
HPIV2 RNA SPEC QL NAA+PROBE: NOT DETECTED
HPIV3 RNA NPH QL NAA+PROBE: NOT DETECTED
HPIV4 P GENE NPH QL NAA+PROBE: NOT DETECTED
IMM GRANULOCYTES # BLD: 0 10*3/MM3 (ref 0–0.03)
IMM GRANULOCYTES NFR BLD: 0 % (ref 0–0.5)
LYMPHOCYTES # BLD AUTO: 0.49 10*3/MM3 (ref 0.9–4.8)
LYMPHOCYTES NFR BLD AUTO: 6.8 % (ref 19.6–45.3)
M PNEUMO IGG SER IA-ACNC: NOT DETECTED
MCH RBC QN AUTO: 30.6 PG (ref 27–32.7)
MCHC RBC AUTO-ENTMCNC: 33.1 G/DL (ref 32.6–36.4)
MCV RBC AUTO: 92.7 FL (ref 79.8–96.2)
MONOCYTES # BLD AUTO: 0.68 10*3/MM3 (ref 0.2–1.2)
MONOCYTES NFR BLD AUTO: 9.4 % (ref 5–12)
NEUTROPHILS # BLD AUTO: 6.02 10*3/MM3 (ref 1.9–8.1)
NEUTROPHILS NFR BLD AUTO: 83 % (ref 42.7–76)
PLATELET # BLD AUTO: 118 10*3/MM3 (ref 140–500)
PMV BLD AUTO: 11.2 FL (ref 6–12)
POTASSIUM BLD-SCNC: 3.8 MMOL/L (ref 3.5–5.2)
PROT SERPL-MCNC: 6.1 G/DL (ref 6–8.5)
RBC # BLD AUTO: 3.82 10*6/MM3 (ref 4.6–6)
RHINOVIRUS RNA SPEC NAA+PROBE: NOT DETECTED
RSV RNA NPH QL NAA+NON-PROBE: NOT DETECTED
SODIUM BLD-SCNC: 142 MMOL/L (ref 136–145)
WBC NRBC COR # BLD: 7.25 10*3/MM3 (ref 4.5–10.7)

## 2017-03-24 PROCEDURE — 87798 DETECT AGENT NOS DNA AMP: CPT | Performed by: INTERNAL MEDICINE

## 2017-03-24 PROCEDURE — 87633 RESP VIRUS 12-25 TARGETS: CPT | Performed by: INTERNAL MEDICINE

## 2017-03-24 PROCEDURE — G0378 HOSPITAL OBSERVATION PER HR: HCPCS

## 2017-03-24 PROCEDURE — 87581 M.PNEUMON DNA AMP PROBE: CPT | Performed by: INTERNAL MEDICINE

## 2017-03-24 PROCEDURE — 25810000003 SODIUM CHLORIDE 0.9 % WITH KCL 20 MEQ 20-0.9 MEQ/L-% SOLUTION: Performed by: INTERNAL MEDICINE

## 2017-03-24 PROCEDURE — 85025 COMPLETE CBC W/AUTO DIFF WBC: CPT | Performed by: INTERNAL MEDICINE

## 2017-03-24 PROCEDURE — 96372 THER/PROPH/DIAG INJ SC/IM: CPT

## 2017-03-24 PROCEDURE — 25010000002 ENOXAPARIN PER 10 MG: Performed by: INTERNAL MEDICINE

## 2017-03-24 PROCEDURE — 80074 ACUTE HEPATITIS PANEL: CPT | Performed by: INTERNAL MEDICINE

## 2017-03-24 PROCEDURE — 80053 COMPREHEN METABOLIC PANEL: CPT | Performed by: INTERNAL MEDICINE

## 2017-03-24 PROCEDURE — 87486 CHLMYD PNEUM DNA AMP PROBE: CPT | Performed by: INTERNAL MEDICINE

## 2017-03-24 PROCEDURE — 96361 HYDRATE IV INFUSION ADD-ON: CPT

## 2017-03-24 RX ADMIN — SERTRALINE 200 MG: 100 TABLET, FILM COATED ORAL at 08:10

## 2017-03-24 RX ADMIN — HYDROCODONE BITARTRATE AND ACETAMINOPHEN 1 TABLET: 7.5; 325 TABLET ORAL at 23:09

## 2017-03-24 RX ADMIN — LISINOPRIL 5 MG: 5 TABLET ORAL at 08:10

## 2017-03-24 RX ADMIN — ACETAMINOPHEN 325 MG: 325 TABLET ORAL at 18:05

## 2017-03-24 RX ADMIN — HYDROCODONE BITARTRATE AND ACETAMINOPHEN 1 TABLET: 7.5; 325 TABLET ORAL at 12:07

## 2017-03-24 RX ADMIN — POTASSIUM CHLORIDE AND SODIUM CHLORIDE 100 ML/HR: 900; 150 INJECTION, SOLUTION INTRAVENOUS at 18:05

## 2017-03-24 RX ADMIN — ENOXAPARIN SODIUM 30 MG: 30 INJECTION SUBCUTANEOUS at 23:09

## 2017-03-24 RX ADMIN — LORAZEPAM 0.5 MG: 0.5 TABLET ORAL at 23:09

## 2017-03-24 RX ADMIN — HYDROCODONE BITARTRATE AND ACETAMINOPHEN 1 TABLET: 7.5; 325 TABLET ORAL at 18:05

## 2017-03-24 RX ADMIN — ACETAMINOPHEN 325 MG: 325 TABLET ORAL at 12:08

## 2017-03-24 RX ADMIN — HYDROCODONE BITARTRATE AND ACETAMINOPHEN 1 TABLET: 7.5; 325 TABLET ORAL at 08:11

## 2017-03-24 RX ADMIN — ACETAMINOPHEN 325 MG: 325 TABLET ORAL at 08:09

## 2017-03-24 RX ADMIN — ASPIRIN 81 MG: 81 TABLET ORAL at 08:10

## 2017-03-24 RX ADMIN — CHOLECALCIFEROL TAB 10 MCG (400 UNIT) 400 UNITS: 10 TAB at 08:10

## 2017-03-24 RX ADMIN — POTASSIUM CHLORIDE AND SODIUM CHLORIDE 100 ML/HR: 900; 150 INJECTION, SOLUTION INTRAVENOUS at 08:14

## 2017-03-24 RX ADMIN — PANTOPRAZOLE SODIUM 40 MG: 40 TABLET, DELAYED RELEASE ORAL at 05:47

## 2017-03-24 RX ADMIN — AMLODIPINE BESYLATE 2.5 MG: 2.5 TABLET ORAL at 08:10

## 2017-03-24 RX ADMIN — ACETAMINOPHEN 325 MG: 325 TABLET ORAL at 23:09

## 2017-03-24 NOTE — PLAN OF CARE
Problem: Pain, Acute (Adult)  Goal: Identify Related Risk Factors and Signs and Symptoms  Outcome: Outcome(s) achieved Date Met:  03/24/17  Goal: Acceptable Pain Control/Comfort Level  Outcome: Ongoing (interventions implemented as appropriate)    Problem: Patient Care Overview (Adult)  Goal: Plan of Care Review  Outcome: Ongoing (interventions implemented as appropriate)    03/24/17 0434   Coping/Psychosocial Response Interventions   Plan Of Care Reviewed With patient   Patient Care Overview   Progress no change   Outcome Evaluation   Outcome Summary/Follow up Plan med for back pain x 1, with relief stated, resting quietly with eyes closed at long interval       Goal: Adult Individualization and Mutuality  Outcome: Ongoing (interventions implemented as appropriate)  Goal: Discharge Needs Assessment  Outcome: Ongoing (interventions implemented as appropriate)    Problem: Fall Risk (Adult)  Goal: Identify Related Risk Factors and Signs and Symptoms  Outcome: Outcome(s) achieved Date Met:  03/24/17  Goal: Absence of Falls  Outcome: Ongoing (interventions implemented as appropriate)

## 2017-03-24 NOTE — PROGRESS NOTES
" LOS: 0 days   Primary Care Physician: No Known Provider     Subjective  Chronic back pain unchanged. Nausea and vomiting improved. No diarrhea reported. No CP SOA. Had some worsening of pain with liquids earlier but now resolved.    Vital Signs  Body mass index is 22.07 kg/(m^2).  Temp:  [97.6 °F (36.4 °C)-98.1 °F (36.7 °C)] 97.9 °F (36.6 °C)  Heart Rate:  [64-95] 64  Resp:  [18] 18  BP: ()/(55-80) 120/80      Objective:  General Appearance:  Comfortable and in no acute distress.    Vital signs: (most recent): Blood pressure 120/80, pulse 64, temperature 97.9 °F (36.6 °C), temperature source Oral, resp. rate 18, height 70\" (177.8 cm), weight 153 lb 12.8 oz (69.8 kg), SpO2 92 %.  Vital signs are normal.    HEENT: Normal HEENT exam.    Lungs:  Normal respiratory rate and normal effort.  Breath sounds clear to auscultation.    Heart: Normal rate.  Regular rhythm.  No murmur.   Abdomen: Abdomen is soft.  There is no abdominal tenderness.  There is no rebound tenderness.  There is no guarding.     Extremities: Normal range of motion.  There is no dependent edema.    Pulses: Distal pulses are intact.    Neurological: Patient is alert.    Pupils:  Pupils are equal, round, and reactive to light.    Skin:  Warm and dry.                Results Review:    I reviewed the patient's new clinical results.      Results from last 7 days  Lab Units 03/24/17  0643 03/23/17  1316   WBC 10*3/mm3 7.25 9.71   HEMOGLOBIN g/dL 11.7* 14.6   PLATELETS 10*3/mm3 118* 120*       Results from last 7 days  Lab Units 03/24/17  0643 03/23/17  1316   SODIUM mmol/L 142 141   POTASSIUM mmol/L 3.8 3.7   CHLORIDE mmol/L 104 99   TOTAL CO2 mmol/L 21.6* 25.8   BUN mg/dL 31* 19   CREATININE mg/dL 0.73* 0.63*   CALCIUM mg/dL 7.8* 9.1   GLUCOSE mg/dL 102* 121*         Hemoglobin A1C:No results found for: HGBA1C    Glucose Range:No results found for: POCGLU    Medication Review: Yes    Physical Therapy:    Assessment/Plan     Active Hospital Problems " (** Indicates Principal Problem)    Diagnosis Date Noted   • **Vomiting and diarrhea [R11.10, R19.7] 03/23/2017   • Abnormal liver enzymes [R74.8] 03/24/2017   • HTN (hypertension) [I10] 03/23/2017   • Anxiety [F41.9] 03/23/2017   • Dehydration [E86.0] 03/23/2017      Resolved Hospital Problems    Diagnosis Date Noted Date Resolved   No resolved problems to display.       Assessment & Plan  -Will advance diet as tolerated and monitor.  -LFTs elevated likely from his acute vomiting. CT with surgically absent GB and no biliary dilation. He has no RUQ pain. Will check CMP in morning and also get acute hepatitis panel.  -RVP negative. No diarrhea reported today.    Disposition: possible dc in morning    Mikey Joiner MD  03/24/17  5:35 PM

## 2017-03-24 NOTE — PLAN OF CARE
Problem: Pain, Acute (Adult)  Goal: Acceptable Pain Control/Comfort Level  Outcome: Ongoing (interventions implemented as appropriate)    Problem: Patient Care Overview (Adult)  Goal: Plan of Care Review  Outcome: Ongoing (interventions implemented as appropriate)    03/24/17 1628   Coping/Psychosocial Response Interventions   Plan Of Care Reviewed With patient   Patient Care Overview   Progress improving   Outcome Evaluation   Outcome Summary/Follow up Plan SCHEDULED MEDS GIVEN PER MAR. PT CONTINUES TO C/O BACK PAIN. IVF'S INFUSING. NO ACUTE DISTRESS NOTED. VS STABLE. WILL CONTINUE TO MONITOR.       Goal: Adult Individualization and Mutuality  Outcome: Ongoing (interventions implemented as appropriate)  Goal: Discharge Needs Assessment  Outcome: Ongoing (interventions implemented as appropriate)    Problem: Fall Risk (Adult)  Goal: Absence of Falls  Outcome: Ongoing (interventions implemented as appropriate)

## 2017-03-24 NOTE — PROGRESS NOTES
"Discharge Planning Assessment  [facility]     Patient Name: Martin Krueger  MRN: [id number]  Today's Date: 3/24/2017    Admit Date: 3/23/2017          Discharge Needs Assessment       03/24/17 1037    Living Environment    Lives With alone    Living Arrangements apartment   attached to son's house    Provides Primary Care For no one, unable/limited ability to care for self    Quality Of Family Relationships supportive    Able to Return to Prior Living Arrangements yes    Discharge Needs Assessment    Concerns To Be Addressed denies needs/concerns at this time    Readmission Within The Last 30 Days no previous admission in last 30 days    Equipment Currently Used at Home wheelchair;grab bar;raised toilet    Equipment Needed After Discharge none    Transportation Available car;family or friend will provide            Discharge Plan       03/24/17 1038    Case Management/Social Work Plan    Plan [facility]    Patient/Family In Agreement With Plan yes    Additional Comments S/W patient at bedside and verified CMS ELENA.  Patient is in observation status and \"observation\" on white board.  Patient lives in an apartment attached to son's house and uses a w/c to get around.  Patient does not leave the house, states that [facility] comes to see him at home and his [facility]  [facility] takes care of patient's mediplanner and he [facility].  Patient uses [facility] on [address] for any quick meds and has no problems getting medications.  Patient has a living will.  Will continue to monitor and assist.  JERONIMO Manriquez, CCP        Discharge Placement     No information found                Demographic Summary       03/24/17 1030    Referral Information    Admission Type observation    Arrived From home or self-care    Referral Source admission list    Reason For Consult discharge planning    Record Reviewed plan of care    Contact Information    Permission Granted to Share " Information With family/designee   sonShell [phone], or She Krueger, daughter-in-law [phone]    Primary Care Physician Information    Name [facility]             Functional Status       03/24/17 1036    Functional Status Current    Ambulation 3-->assistive equipment and person    Transferring 2-->assistive person    Toileting 2-->assistive person    Bathing 2-->assistive person    Dressing 2-->assistive person    Eating 0-->independent    Communication 0-->understands/communicates without difficulty    Swallowing (if score 2 or more for any item, consult Rehab Services) 0-->swallows foods/liquids without difficulty    Functional Status Prior    Ambulation 1-->assistive equipment    Transferring 1-->assistive equipment    Toileting 1-->assistive equipment    Bathing 0-->independent    Dressing 0-->independent    Eating 0-->independent    Communication 0-->understands/communicates without difficulty    Swallowing 0-->swallows foods/liquids without difficulty    IADL    Medications independent   [facility] does a mediplanner    Meal Preparation independent    Housekeeping independent    Laundry independent    Shopping assistive person    Oral Care independent    Cognitive/Perceptual/Developmental    Current Mental Status/Cognitive Functioning no deficits noted            Psychosocial     None            Abuse/Neglect     None            Legal     None            Substance Abuse     None            Patient Forms     None          Hermelinda Azevedo RN

## 2017-03-25 VITALS
DIASTOLIC BLOOD PRESSURE: 80 MMHG | BODY MASS INDEX: 22.02 KG/M2 | HEART RATE: 72 BPM | HEIGHT: 70 IN | WEIGHT: 153.8 LBS | TEMPERATURE: 98.5 F | RESPIRATION RATE: 18 BRPM | OXYGEN SATURATION: 92 % | SYSTOLIC BLOOD PRESSURE: 141 MMHG

## 2017-03-25 PROBLEM — J96.01 ACUTE RESPIRATORY FAILURE WITH HYPOXIA (HCC): Status: ACTIVE | Noted: 2017-03-25

## 2017-03-25 PROBLEM — R74.8 ABNORMAL LIVER ENZYMES: Status: RESOLVED | Noted: 2017-03-24 | Resolved: 2017-03-25

## 2017-03-25 PROBLEM — E86.0 DEHYDRATION: Status: RESOLVED | Noted: 2017-03-23 | Resolved: 2017-03-25

## 2017-03-25 PROBLEM — R11.10 VOMITING AND DIARRHEA: Status: RESOLVED | Noted: 2017-03-23 | Resolved: 2017-03-25

## 2017-03-25 PROBLEM — R19.7 VOMITING AND DIARRHEA: Status: RESOLVED | Noted: 2017-03-23 | Resolved: 2017-03-25

## 2017-03-25 LAB
ALBUMIN SERPL-MCNC: 3 G/DL (ref 3.5–5.2)
ALBUMIN/GLOB SERPL: 1 G/DL
ALP SERPL-CCNC: 73 U/L (ref 39–117)
ALT SERPL W P-5'-P-CCNC: 51 U/L (ref 1–41)
ANION GAP SERPL CALCULATED.3IONS-SCNC: 10.8 MMOL/L
AST SERPL-CCNC: 34 U/L (ref 1–40)
BILIRUB SERPL-MCNC: 0.3 MG/DL (ref 0.1–1.2)
BUN BLD-MCNC: 19 MG/DL (ref 8–23)
BUN/CREAT SERPL: 32.8 (ref 7–25)
CALCIUM SPEC-SCNC: 7.9 MG/DL (ref 8.6–10.5)
CHLORIDE SERPL-SCNC: 106 MMOL/L (ref 98–107)
CO2 SERPL-SCNC: 24.2 MMOL/L (ref 22–29)
CREAT BLD-MCNC: 0.58 MG/DL (ref 0.76–1.27)
DEPRECATED RDW RBC AUTO: 52 FL (ref 37–54)
ERYTHROCYTE [DISTWIDTH] IN BLOOD BY AUTOMATED COUNT: 15.2 % (ref 11.5–14.5)
GFR SERPL CREATININE-BSD FRML MDRD: 133 ML/MIN/1.73
GLOBULIN UR ELPH-MCNC: 3.1 GM/DL
GLUCOSE BLD-MCNC: 93 MG/DL (ref 65–99)
HCT VFR BLD AUTO: 35 % (ref 40.4–52.2)
HGB BLD-MCNC: 11.3 G/DL (ref 13.7–17.6)
MCH RBC QN AUTO: 30.3 PG (ref 27–32.7)
MCHC RBC AUTO-ENTMCNC: 32.3 G/DL (ref 32.6–36.4)
MCV RBC AUTO: 93.8 FL (ref 79.8–96.2)
PLATELET # BLD AUTO: 106 10*3/MM3 (ref 140–500)
PMV BLD AUTO: 10.8 FL (ref 6–12)
POTASSIUM BLD-SCNC: 4.2 MMOL/L (ref 3.5–5.2)
PROT SERPL-MCNC: 6.1 G/DL (ref 6–8.5)
RBC # BLD AUTO: 3.73 10*6/MM3 (ref 4.6–6)
SODIUM BLD-SCNC: 141 MMOL/L (ref 136–145)
WBC NRBC COR # BLD: 6.82 10*3/MM3 (ref 4.5–10.7)

## 2017-03-25 PROCEDURE — 85027 COMPLETE CBC AUTOMATED: CPT | Performed by: INTERNAL MEDICINE

## 2017-03-25 PROCEDURE — 94640 AIRWAY INHALATION TREATMENT: CPT

## 2017-03-25 PROCEDURE — 96361 HYDRATE IV INFUSION ADD-ON: CPT

## 2017-03-25 PROCEDURE — 94799 UNLISTED PULMONARY SVC/PX: CPT

## 2017-03-25 PROCEDURE — 80053 COMPREHEN METABOLIC PANEL: CPT | Performed by: INTERNAL MEDICINE

## 2017-03-25 PROCEDURE — 25810000003 SODIUM CHLORIDE 0.9 % WITH KCL 20 MEQ 20-0.9 MEQ/L-% SOLUTION: Performed by: INTERNAL MEDICINE

## 2017-03-25 PROCEDURE — G0378 HOSPITAL OBSERVATION PER HR: HCPCS

## 2017-03-25 RX ORDER — ALBUTEROL SULFATE 2.5 MG/3ML
2.5 SOLUTION RESPIRATORY (INHALATION) EVERY 6 HOURS PRN
Status: DISCONTINUED | OUTPATIENT
Start: 2017-03-25 | End: 2017-03-25 | Stop reason: HOSPADM

## 2017-03-25 RX ORDER — BUDESONIDE AND FORMOTEROL FUMARATE DIHYDRATE 160; 4.5 UG/1; UG/1
2 AEROSOL RESPIRATORY (INHALATION)
Qty: 1 INHALER | Refills: 0 | Status: SHIPPED | OUTPATIENT
Start: 2017-03-25

## 2017-03-25 RX ORDER — ALBUTEROL SULFATE 90 UG/1
2 AEROSOL, METERED RESPIRATORY (INHALATION) EVERY 4 HOURS PRN
Qty: 1 INHALER | Refills: 0 | Status: SHIPPED | OUTPATIENT
Start: 2017-03-25

## 2017-03-25 RX ADMIN — PANTOPRAZOLE SODIUM 40 MG: 40 TABLET, DELAYED RELEASE ORAL at 06:10

## 2017-03-25 RX ADMIN — ASPIRIN 81 MG: 81 TABLET ORAL at 08:28

## 2017-03-25 RX ADMIN — LISINOPRIL 5 MG: 5 TABLET ORAL at 08:28

## 2017-03-25 RX ADMIN — POTASSIUM CHLORIDE AND SODIUM CHLORIDE 100 ML/HR: 900; 150 INJECTION, SOLUTION INTRAVENOUS at 04:10

## 2017-03-25 RX ADMIN — AMLODIPINE BESYLATE 2.5 MG: 2.5 TABLET ORAL at 08:28

## 2017-03-25 RX ADMIN — HYDROCODONE BITARTRATE AND ACETAMINOPHEN 1 TABLET: 7.5; 325 TABLET ORAL at 08:29

## 2017-03-25 RX ADMIN — HYDROCODONE BITARTRATE AND ACETAMINOPHEN 1 TABLET: 7.5; 325 TABLET ORAL at 11:51

## 2017-03-25 RX ADMIN — SERTRALINE 200 MG: 100 TABLET, FILM COATED ORAL at 08:28

## 2017-03-25 RX ADMIN — ACETAMINOPHEN 325 MG: 325 TABLET ORAL at 11:51

## 2017-03-25 RX ADMIN — ACETAMINOPHEN 325 MG: 325 TABLET ORAL at 08:28

## 2017-03-25 RX ADMIN — CHOLECALCIFEROL TAB 10 MCG (400 UNIT) 400 UNITS: 10 TAB at 08:28

## 2017-03-25 RX ADMIN — TIOTROPIUM BROMIDE 1 CAPSULE: 18 CAPSULE ORAL; RESPIRATORY (INHALATION) at 16:31

## 2017-03-25 NOTE — PROGRESS NOTES
" LOS: 0 days   Primary Care Physician: No Known Provider     Subjective  No SOA or cough. Reports has not needed supplemental oxygen at home and is followed by [facility]. No NVD or abdominal pain. No CP. Feeling well.    Vital Signs  Body mass index is 22.07 kg/(m^2).  Temp:  [97.7 °F (36.5 °C)-98.6 °F (37 °C)] 98.6 °F (37 °C)  Heart Rate:  [64-66] 65  Resp:  [18-20] 18  BP: (120-142)/(78-85) 134/85      Objective:  General Appearance:  Comfortable and in no acute distress.    Vital signs: (most recent): Blood pressure 134/85, pulse 65, temperature 98.6 °F (37 °C), temperature source Oral, resp. rate 18, height 70\" (177.8 cm), weight 153 lb 12.8 oz (69.8 kg), SpO2 92 %.  Vital signs are normal.    HEENT: Normal HEENT exam.    Lungs:  Normal respiratory rate and normal effort.  Breath sounds clear to auscultation.    Heart: Normal rate.  Regular rhythm.  No murmur.   Abdomen: Abdomen is soft.  There is no abdominal tenderness.  There is no rebound tenderness.  There is no guarding.     Extremities: Normal range of motion.  There is no dependent edema.    Pulses: Distal pulses are intact.    Neurological: Patient is alert.    Pupils:  Pupils are equal, round, and reactive to light.    Skin:  Warm and dry.                Results Review:    I reviewed the patient's new clinical results.      Results from last 7 days  Lab Units 03/25/17  0530 03/24/17  0643   WBC 10*3/mm3 6.82 7.25   HEMOGLOBIN g/dL 11.3* 11.7*   PLATELETS 10*3/mm3 106* 118*       Results from last 7 days  Lab Units 03/25/17  0530 03/24/17  0643   SODIUM mmol/L 141 142   POTASSIUM mmol/L 4.2 3.8   CHLORIDE mmol/L 106 104   TOTAL CO2 mmol/L 24.2 21.6*   BUN mg/dL 19 31*   CREATININE mg/dL 0.58* 0.73*   CALCIUM mg/dL 7.9* 7.8*   GLUCOSE mg/dL 93 102*         Hemoglobin A1C:No results found for: HGBA1C    Glucose Range:No results found for: POCGLU    Medication Review: Yes    Physical Therapy:    Assessment/Plan     Active Hospital Problems (** Indicates " Principal Problem)    Diagnosis Date Noted   • **Vomiting and diarrhea [R11.10, R19.7] 03/23/2017   • Abnormal liver enzymes [R74.8] 03/24/2017   • HTN (hypertension) [I10] 03/23/2017   • Anxiety [F41.9] 03/23/2017   • Dehydration [E86.0] 03/23/2017      Resolved Hospital Problems    Diagnosis Date Noted Date Resolved   No resolved problems to display.       Assessment & Plan  -LFT improved. Acute Hep panel is negative. No abd pain now and CT without hepatobiliary disease and surgically absent gallbladder.  -Diarrhea and nausea resolved.  -Hypoxic to ~86% on room air during exam. Lungs CTAB. CXR was clear and RVP negative. Looking over records since admission he has intermittently been on 1L NC. Will order walking oximetry to eval. Continue his tiotropium and albuterol prn.    Disposition: Stable for discharge depending on if home oxygen needs to be arranged.    Mikye Joiner MD  03/25/17  12:55 PM

## 2017-03-25 NOTE — PLAN OF CARE
Problem: Pain, Acute (Adult)  Goal: Identify Related Risk Factors and Signs and Symptoms  Outcome: Outcome(s) achieved Date Met:  03/25/17  Goal: Acceptable Pain Control/Comfort Level  Outcome: Outcome(s) achieved Date Met:  03/25/17    Problem: Patient Care Overview (Adult)  Goal: Plan of Care Review  Outcome: Outcome(s) achieved Date Met:  03/25/17 03/25/17 1654   Coping/Psychosocial Response Interventions   Plan Of Care Reviewed With patient  (She)   Patient Care Overview   Progress improving   Outcome Evaluation   Outcome Summary/Follow up Plan No c/o chest pain or SOA. Home O2 at BS. Ambulance transportation arranged. Daughter in law She to  prescription meds and at pt's residence for D/ C. Pt states ready for D/C .       Goal: Adult Individualization and Mutuality  Outcome: Outcome(s) achieved Date Met:  03/25/17  Goal: Discharge Needs Assessment  Outcome: Outcome(s) achieved Date Met:  03/25/17 03/25/17 1654   Discharge Needs Assessment   Concerns To Be Addressed (home 02 and transportation home)   Equipment Needed After Discharge oxygen   Discharge Disposition home healthcare service   Living Environment   Transportation Available ambulance         Problem: Fall Risk (Adult)  Goal: Absence of Falls  Outcome: Outcome(s) achieved Date Met:  03/25/17

## 2017-03-25 NOTE — NURSING NOTE
"03/25/2017 0800 Removed nasal cannula from pt in anticipation of discharge today.  Pt is unable to ambulate (for a walking oximetry),  due to a previous \"bone on bone\" ortho dx.  O2 sats on room air dropped to 85%-87%.  O2 nasal cannula replaced at 1 L to elevate sats to 92%.  "

## 2017-03-25 NOTE — DISCHARGE INSTRUCTIONS
Call [facility]  [phone]   when patient  gets home to get the rest of the oxygen set up.

## 2017-03-25 NOTE — PLAN OF CARE
Problem: Pain, Acute (Adult)  Goal: Acceptable Pain Control/Comfort Level  Outcome: Ongoing (interventions implemented as appropriate)    Problem: Patient Care Overview (Adult)  Goal: Plan of Care Review  Outcome: Ongoing (interventions implemented as appropriate)    03/25/17 0433   Coping/Psychosocial Response Interventions   Plan Of Care Reviewed With patient   Patient Care Overview   Progress improving   Outcome Evaluation   Outcome Summary/Follow up Plan med for pain with routine meds with relief stated, eyes closed at long interval, resting quietly       Goal: Adult Individualization and Mutuality  Outcome: Ongoing (interventions implemented as appropriate)  Goal: Discharge Needs Assessment  Outcome: Ongoing (interventions implemented as appropriate)    Problem: Fall Risk (Adult)  Goal: Absence of Falls  Outcome: Ongoing (interventions implemented as appropriate)

## 2017-03-25 NOTE — DISCHARGE SUMMARY
Date of Admission: 3/23/2017  Date of Discharge:  3/25/2017  Primary Care Physician: No Known Provider     Discharge Diagnosis:  Active Hospital Problems (** Indicates Principal Problem)    Diagnosis Date Noted   • Acute respiratory failure with hypoxia [J96.01] 03/25/2017   • HTN (hypertension) [I10] 03/23/2017   • Anxiety [F41.9] 03/23/2017      Resolved Hospital Problems    Diagnosis Date Noted Date Resolved   • **Vomiting and diarrhea [R11.10, R19.7] 03/23/2017 03/25/2017   • Abnormal liver enzymes [R74.8] 03/24/2017 03/25/2017   • Dehydration [E86.0] 03/23/2017 03/25/2017       Presenting Problem/History of Present Illness  Atypical chest pain [R07.89]  Vomiting and diarrhea [R11.10, R19.7]   Patient is very vague in his description of symptoms and it was an effort to get proper information from him. I'm still not sure whether I was able to get reliable information from him.  Patient is a 85-year-old male with conjugated past medical history has been complaining of nausea and stomach and back pain since he ate some chicken 3-4 days ago. Patient is not sure about exact timing of his onset of symptoms. He's been struggling with these symptoms taking hydrocodone pills without any improvement. In the emergency room and has been having multiple episodes of diarrhea after episode of vomiting. Patient has been having chest discomfort mainly pressure-like and has associated shortness of breath  Patient usually gets [facility] at the [facility].  He is pleasantly confused.    Hospital Course  Patient is a 85 y.o. male who presented with nausea, vomiting, diarrhea, and abdominal pain. He was admitted and CT abdomen was obtained that showed no biliary dilatation and surgically absent gallbladder. He also had CTA Chest that was negative for PE and also did not show any acute process. He was given fluids and supplemental oxygen. RVP was negative and breathing remained stable. He did require 1L O2 to keep saturation above 88%  and on day of discharge he desaturated to 86% on room air during my exam. His lungs remained clear on exam and he had no respiratory complaints. His nausea and vomiting resolved. His diarrhea resolved. He did have transient elevation of AST and ALT. Acute hepatitis panel was negative and LFTs were improving at time of discharge.    [facility] I am going to discharge with 1L NC supplemental oxygen and addition of Symbicort to his COPD regimen. He should continue his tiotropium and prescription for albuterol has been given. Recommend repeat oximetry testing to see if supplemental oxygen can be stopped at outpatient followup.    Procedures Performed:         Consults:   Consults     Date and Time Order Name Status Description    3/23/2017 1524 [facility] (on-call MD unless specified) Completed              Condition on Discharge: Stable    Discharge Disposition  Home-Health Care [facility] [place]    Discharge Medications:   Martin Krueger   Home Medication Instructions TRACEY:[id number]    Printed on:03/25/17 3400   Medication Information                      acetaminophen (TYLENOL) 325 MG tablet  Take 325 mg by mouth 4 (Four) Times a Day. Take with hydrocodone for pain              albuterol (PROVENTIL HFA;VENTOLIN HFA) 108 (90 BASE) MCG/ACT inhaler  Inhale 2 puffs Every 4 (Four) Hours As Needed for Wheezing or Shortness of Air.             alfuzosin (UROXATRAL) 10 MG 24 hr tablet  Take 10 mg by mouth every night at bedtime.             amLODIPine (NORVASC) 2.5 MG tablet  Take 2.5 mg by mouth Daily.             aspirin 81 MG EC tablet  Take 81 mg by mouth Daily.             budesonide-formoterol (SYMBICORT) 160-4.5 MCG/ACT inhaler  Inhale 2 puffs 2 (Two) Times a Day.             HYDROcodone-acetaminophen (NORCO) 7.5-325 MG per tablet  Take 1 tablet by mouth 4 (Four) Times a Day.             lisinopril (PRINIVIL,ZESTRIL) 5 MG tablet  Take 5 mg by mouth Daily.             MENTHOL-METHYL  SALICYLATE EX  Apply 1 application topically 3 (Three) Times a Day.             miconazole (MICOTIN) 2 % powder  Apply 1 application topically 2 (Two) Times a Day As Needed for Itching (for feet and groin area).             omeprazole (priLOSEC) 20 MG capsule  Take 20 mg by mouth Every Morning.             sennosides-docusate sodium (SENOKOT-S) 8.6-50 MG tablet  Take 2 tablets by mouth At Night As Needed for Constipation.             sertraline (ZOLOFT) 100 MG tablet  Take 200 mg by mouth Daily.             Tiotropium Bromide Monohydrate 2.5 MCG/ACT aerosol solution  Inhale 2 puffs Daily.             Vitamin D, Cholecalciferol, (CHOLECALCIFEROL) 400 UNITS tablet  Take 400 Units by mouth Daily.                 Discharge Diet:   Diet Instructions     Advance Diet As Tolerated                     Activity at Discharge:   Activity Instructions     Activity as Tolerated                     Follow-up Appointments:  No future appointments.  Additional Instructions for the Follow-ups that You Need to Schedule     Discharge Follow-up with PCP    As directed    Follow Up Details:  1-2 weeks                 Test Results Pending at Discharge       Mikey Joiner MD  03/25/17  1:15 PM    Time Spent on Discharge Activities: >30min

## 2017-03-27 NOTE — PROGRESS NOTES
Continued Stay Note  [facility]     Patient Name: Martin Krueger  MRN: [id number]  Today's Date: 3/27/2017    Admit Date: 3/23/2017          Discharge Plan       03/27/17 1025    Case Management/Social Work Plan    Plan [facility]    Patient/Family In Agreement With Plan yes    Final Note    Final Note went home with [facility]              Discharge Codes       03/27/17 1025    Discharge Codes    Discharge Codes 06  Discharged/transferred to home under care of organized home health service organization in anticipation of skilled care        Expected Discharge Date and Time     Expected Discharge Date Expected Discharge Time    Mar 25, 2017             Hermelinda Azevedo RN